# Patient Record
(demographics unavailable — no encounter records)

---

## 2024-11-08 NOTE — PHYSICAL EXAM
[General Appearance - Alert] : alert [General Appearance - In No Acute Distress] : in no acute distress [Ankle Swelling (On Exam)] : not present [Varicose Veins Of Lower Extremities] : not present [] : not present [Delayed in the Right Toes] : capillary refills normal in right toes [Delayed in the Left Toes] : capillary refills normal in the left toes [2+] : left foot dorsalis pedis 2+ [de-identified] : +TTP anterior aspect of talonavicular joint and ATFL tendon Unable to assess anterior draw test 2/2 to pain, unable to perform full ROM of foot 2/2 to pain due to acute injury The foot is groosly edematous  [FreeTextEntry1] : Anterior shin abrasion  [Sensation] : the sensory exam was normal to light touch and pinprick [Vibration Dec.] : normal vibratory sensation at the level of the toes [Oriented To Time, Place, And Person] : oriented to person, place, and time [Impaired Insight] : insight and judgment were intact
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
## 2024-11-15 NOTE — PHYSICAL EXAM
[General Appearance - Alert] : alert [General Appearance - In No Acute Distress] : in no acute distress [2+] : left foot dorsalis pedis 2+ [Skin Lesions] : no skin lesions [Sensation] : the sensory exam was normal to light touch and pinprick [Oriented To Time, Place, And Person] : oriented to person, place, and time [Impaired Insight] : insight and judgment were intact [Ankle Swelling (On Exam)] : not present [Varicose Veins Of Lower Extremities] : not present [] : not present [Delayed in the Right Toes] : capillary refills normal in right toes [Delayed in the Left Toes] : capillary refills normal in the left toes [de-identified] : +TTP anterior aspect of talonavicular joint and ATFL tendon Unable to assess anterior draw test 2/2 to pain, unable to perform full ROM of foot 2/2 to pain due to acute injury The foot is groosly edematous  [FreeTextEntry1] : Anterior shin abrasion  [Vibration Dec.] : normal vibratory sensation at the level of the toes
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
## 2024-11-15 NOTE — PHYSICAL EXAM
[General Appearance - Alert] : alert [General Appearance - In No Acute Distress] : in no acute distress [2+] : left foot dorsalis pedis 2+ [Skin Lesions] : no skin lesions [Sensation] : the sensory exam was normal to light touch and pinprick [Oriented To Time, Place, And Person] : oriented to person, place, and time [Impaired Insight] : insight and judgment were intact [Ankle Swelling (On Exam)] : not present [Varicose Veins Of Lower Extremities] : not present [] : not present [Delayed in the Right Toes] : capillary refills normal in right toes [Delayed in the Left Toes] : capillary refills normal in the left toes [de-identified] : +TTP anterior aspect of talonavicular joint and ATFL tendon Unable to assess anterior draw test 2/2 to pain, unable to perform full ROM of foot 2/2 to pain due to acute injury The foot is groosly edematous  [FreeTextEntry1] : Anterior shin abrasion  [Vibration Dec.] : normal vibratory sensation at the level of the toes

## 2024-11-15 NOTE — PHYSICAL EXAM
[General Appearance - Alert] : alert [General Appearance - In No Acute Distress] : in no acute distress [2+] : left foot dorsalis pedis 2+ [Skin Lesions] : no skin lesions [Sensation] : the sensory exam was normal to light touch and pinprick [Oriented To Time, Place, And Person] : oriented to person, place, and time [Impaired Insight] : insight and judgment were intact [Ankle Swelling (On Exam)] : not present [Varicose Veins Of Lower Extremities] : not present [] : not present [Delayed in the Right Toes] : capillary refills normal in right toes [Delayed in the Left Toes] : capillary refills normal in the left toes [de-identified] : +TTP anterior aspect of talonavicular joint and ATFL tendon Unable to assess anterior draw test 2/2 to pain, unable to perform full ROM of foot 2/2 to pain due to acute injury The foot is groosly edematous  [FreeTextEntry1] : Anterior shin abrasion  [Vibration Dec.] : normal vibratory sensation at the level of the toes
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
